# Patient Record
Sex: MALE | Race: WHITE
[De-identification: names, ages, dates, MRNs, and addresses within clinical notes are randomized per-mention and may not be internally consistent; named-entity substitution may affect disease eponyms.]

---

## 2018-03-28 ENCOUNTER — HOSPITAL ENCOUNTER (EMERGENCY)
Dept: HOSPITAL 25 - UCCORT | Age: 73
Discharge: HOME | End: 2018-03-28
Payer: MEDICARE

## 2018-03-28 VITALS — DIASTOLIC BLOOD PRESSURE: 65 MMHG | SYSTOLIC BLOOD PRESSURE: 159 MMHG

## 2018-03-28 DIAGNOSIS — R42: ICD-10-CM

## 2018-03-28 DIAGNOSIS — Z87.891: ICD-10-CM

## 2018-03-28 DIAGNOSIS — Z88.8: ICD-10-CM

## 2018-03-28 DIAGNOSIS — H65.90: ICD-10-CM

## 2018-03-28 DIAGNOSIS — J06.9: Primary | ICD-10-CM

## 2018-03-28 PROCEDURE — G0463 HOSPITAL OUTPT CLINIC VISIT: HCPCS

## 2018-03-28 PROCEDURE — 99212 OFFICE O/P EST SF 10 MIN: CPT

## 2018-03-28 NOTE — UC
Throat Pain/Nasal Cameron HPI





- HPI Summary


HPI Summary: 





Pt presents to ED with reports if head congestion, sore throat, and dizziness x 

1week.  + PND  no erythema, no exudate.  mild cough, non productive. Pt states 

has phlegm. No fever, chills. pt states dizzines "when turn head fast." not 

constant. pt no difficult with ambulation or balance, driving. Took tylenol flu 

with mild improvement. None today. no sob, cp, abd pain. no n/v/d. No change in 

diet. No fall. no ha, vision changes





Pt's medications reviewed this visit


Pt noted to have mild HTN - pt with h/o similiar 





- History of Current Complaint


Chief Complaint: UCGeneralIllness


Stated Complaint: COLD SX'S, SORE THROAT


Time Seen by Provider: 03/28/18 14:31


Hx Obtained From: Patient


Onset/Duration: Gradual Onset


Severity: Mild


Pain Intensity: 0


Cough: Nonproductive


Associated Signs & Symptoms: Positive: Nasal Discharge, Other - sore throat.  

Negative: Dysphagia, Sinus Discomfort, Fever





- Allergies/Home Medications


Allergies/Adverse Reactions: 


 Allergies











Allergy/AdvReac Type Severity Reaction Status Date / Time


 


clopidogrel [From Plavix] Allergy  Difficulty Verified 03/28/18 14:14





   Breathing  











Home Medications: 


 Home Medications





Aspirin EC TAB* [Ecotrin EC TAB*] 325 mg PO DAILY 03/28/18 [History Confirmed 03 /28/18]


Atenolol TAB* [Tenormin TAB* 25 MG] 25 mg PO DAILY 03/28/18 [History Confirmed 

03/28/18]


Lisinopril TAB* [Prinivil TAB*] 10 mg PO DAILY 03/28/18 [History Confirmed 03/28 /18]


Rosuvastatin (NF) [Crestor (NF)] 20 mg PO 1700 03/28/18 [History Confirmed 03/28 /18]











PMH/Surg Hx/FS Hx/Imm Hx


Previously Healthy: Yes


Cardiovascular History: Cardiac Disease - last stress test 1-2 years, 

Hypertension





- Surgical History


Surgical History: Yes


Surgery Procedure, Year, and Place: PROSTATECTOMY-1990'S-[place].  LT.[place] 

SURGERY-PLATES INSERTED-1990'S- [place].  QUAD [id number]. [facility].

  BILAT. CATARACTS REMOVED WITH LENS IMPLANTS-2014 [facility] [place]





- Family History


Known Family History: Positive: Hypertension





- Social History


Occupation: Retired


Lives: With Family


Alcohol Use: None


Substance Use Type: None


Smoking Status (MU): Former Smoker


Type: Cigarettes


Amount Used/How Often: 1 PPD


Length of Time of Smoking/Using Tobacco: 10 YRS


Have You Smoked in the Last Year: No


When Did the Patient Quit Smoking/Using Tobacco: 1960'S





- Immunization History


Most Recent Tetanus Shot: JULY 2011





Review of Systems


Constitutional: Negative


Skin: Negative


ENT: Nasal Discharge, Sinus Congestion


Respiratory: Cough


Cardiovascular: Negative


Gastrointestinal: Negative


All Other Systems Reviewed And Are Negative: Yes





Physical Exam


Triage Information Reviewed: Yes


Appearance: Well-Appearing, No Pain Distress, Well-Nourished


Vital Signs: 


 Initial Vital Signs











Temp  97.7 F   03/28/18 14:14


 


Pulse  66   03/28/18 14:14


 


Resp  20   03/28/18 14:14


 


BP  159/65   03/28/18 14:14


 


Pulse Ox  99   03/28/18 14:14











Vital Signs Reviewed: Yes


Eyes: Positive: Conjunctiva Clear, Conjunctiva Inflamed, Discharge, Other: - 

right 3 beat extinguishing nystagmus


ENT: Positive: Other - right TM + fluid, no erythema, no buldge turbinates 

inflammed and boggy  + PND   uvula midline, no exudate, no erythema


Dental Exam: Normal


Neck exam: Normal


Neck: Positive: Supple, Nontender, No Lymphadenopathy


Respiratory Exam: Normal


Respiratory: Positive: Chest non-tender, Lungs clear, Normal breath sounds, No 

respiratory distress, No accessory muscle use


Cardiovascular Exam: Normal


Cardiovascular: Positive: RRR, No Murmur, Other: - no bruits b/l


Abdominal Exam: Normal


Abdomen Description: Positive: Nontender, No Organomegaly, Soft


Bowel Sounds: Positive: Present


Musculoskeletal Exam: Normal


Musculoskeletal: Positive: Strength Intact


Neurological Exam: Normal


Neurological: Positive: Alert, Other: - CN 2-12 intact,pt with 3 beat 

extinguishing horizontal nystagmus to right + FNF + ambulatory without 

difficulty neg rhomberg


Psychological Exam: Normal


Psychological: Positive: Normal Response To Family


Skin Exam: Normal





Throat Pain/Nasal Course/Dx





- Course


Course Of Treatment: Pt presents with nasal congestion, PND, and dizziness.  On 

exam, nasal congestion, serous fluid right ear and nystagmus.  will Rx flonase.

  recommend claritin, mucinex.  d/w avoid decongestant.  PCP for follow-up 3-5 

days.  911/return precautions.  pt comfortable and in agreement with plan





- Differential Dx/Diagnosis


Provider Diagnoses: URI.  vertigo.  otitis serous





Discharge





- Sign-Out/Discharge


Documenting (check all that apply): Discharge





- Discharge Plan


Condition: Stable


Disposition: HOME


Prescriptions: 


Fluticasone NASAL SPRAY 50MCG* [Flonase NASAL SPRAY 50MCG*] 2 spray BOTH NARES 

DAILY #1 btl


Meclizine TAB* [Antivert 12.5 TAB*] 25 mg PO Q8HR #15 tab


Patient Education Materials:  Vertigo (ED), Upper Respiratory Infection (ED), 

Serous Otitis Media (ED)


Referrals: 


Vladimir Vang MD [Primary Care Provider] - 


Additional Instructions: 


- Stay well hydrated. Drink plenty of non-alcoholic, non-caffinated beverages


- Take meclizine as prescribed as needed for dizziness


- Use nasal spray daily as instructed


- Okay to take mucinex, claritin, allegra. Be careful to avoid decongestants (

pseudoephedrerine) as this can raise your blood pressure


- if you are able, humidify the room where you sleep


Contact your doctor to schedule a follow-up appointment. contact your doctor or 

return with questions or concerns








- Billing Disposition and Condition


Condition: STABLE


Disposition: HOME